# Patient Record
Sex: FEMALE | Race: WHITE | NOT HISPANIC OR LATINO | URBAN - METROPOLITAN AREA
[De-identification: names, ages, dates, MRNs, and addresses within clinical notes are randomized per-mention and may not be internally consistent; named-entity substitution may affect disease eponyms.]

---

## 2023-08-03 ENCOUNTER — OUTPATIENT (OUTPATIENT)
Dept: OUTPATIENT SERVICES | Facility: HOSPITAL | Age: 53
LOS: 1 days | End: 2023-08-03
Payer: COMMERCIAL

## 2023-08-03 ENCOUNTER — TRANSCRIPTION ENCOUNTER (OUTPATIENT)
Age: 53
End: 2023-08-03

## 2023-08-03 VITALS
HEART RATE: 65 BPM | OXYGEN SATURATION: 98 % | DIASTOLIC BLOOD PRESSURE: 78 MMHG | RESPIRATION RATE: 14 BRPM | SYSTOLIC BLOOD PRESSURE: 138 MMHG

## 2023-08-03 VITALS
RESPIRATION RATE: 21 BRPM | TEMPERATURE: 96 F | DIASTOLIC BLOOD PRESSURE: 71 MMHG | WEIGHT: 293 LBS | OXYGEN SATURATION: 97 % | HEIGHT: 67 IN | HEART RATE: 75 BPM | SYSTOLIC BLOOD PRESSURE: 148 MMHG

## 2023-08-03 DIAGNOSIS — K59.04 CHRONIC IDIOPATHIC CONSTIPATION: ICD-10-CM

## 2023-08-03 DIAGNOSIS — R13.14 DYSPHAGIA, PHARYNGOESOPHAGEAL PHASE: ICD-10-CM

## 2023-08-03 PROCEDURE — 88305 TISSUE EXAM BY PATHOLOGIST: CPT | Mod: 26

## 2023-08-03 PROCEDURE — 88305 TISSUE EXAM BY PATHOLOGIST: CPT

## 2023-08-03 PROCEDURE — 43239 EGD BIOPSY SINGLE/MULTIPLE: CPT

## 2023-08-03 PROCEDURE — 45380 COLONOSCOPY AND BIOPSY: CPT | Mod: PT

## 2023-08-03 RX ORDER — SODIUM CHLORIDE 9 MG/ML
500 INJECTION INTRAMUSCULAR; INTRAVENOUS; SUBCUTANEOUS
Refills: 0 | Status: DISCONTINUED | OUTPATIENT
Start: 2023-08-03 | End: 2023-08-17

## 2023-08-03 RX ORDER — LIDOCAINE HCL 20 MG/ML
4 VIAL (ML) INJECTION ONCE
Refills: 0 | Status: DISCONTINUED | OUTPATIENT
Start: 2023-08-03 | End: 2023-08-17

## 2023-08-03 NOTE — ASU PATIENT PROFILE, ADULT - FALL HARM RISK - UNIVERSAL INTERVENTIONS
Bed in lowest position, wheels locked, appropriate side rails in place/Call bell, personal items and telephone in reach/Instruct patient to call for assistance before getting out of bed or chair/Non-slip footwear when patient is out of bed/[place] to call system/Physically safe environment - no spills, clutter or unnecessary equipment/Purposeful Proactive Rounding/Room/bathroom lighting operational, light cord in reach

## 2023-08-03 NOTE — PRE PROCEDURE NOTE - PRE PROCEDURE EVALUATION
Attending Physician: Cindy Penaloza MD                            Procedure: EGD and colonoscopy    Indication for Procedure: dysphagia, screening  ________________________________________________________  PAST MEDICAL & SURGICAL HISTORY:  H/O esophageal reflux    No significant past surgical history    ALLERGIES:  No Known Allergies    HOME MEDICATIONS:    AICD/PPM: [ ] yes   [X] no    PERTINENT LAB DATA:        PHYSICAL EXAMINATION:    Height (cm): 170.2  Weight (kg): 152  BMI (kg/m2): 52.5  BSA (m2): 2.52T(C): --  HR: 75  BP: 148/71  RR: 21  SpO2: 97%    Constitutional: NAD    Neck:  No JVD  Respiratory: CTAB/L  Cardiovascular: S1 and S2  Gastrointestinal: BS+, soft, NT/ND  Extremities: No peripheral edema  Neurological: A/O x 3, no focal deficits        COMMENTS:    The patient is a suitable candidate for the planned procedure unless box checked [ ]  No, explain:

## 2023-08-03 NOTE — ASU DISCHARGE PLAN (ADULT/PEDIATRIC) - NS MD DC FALL RISK RISK
For information on Fall & Injury Prevention, visit: [email or web address] OR  [email or web address] OR  https://www.cdc.gov/steadi/patient.html

## 2023-08-07 LAB — SURGICAL PATHOLOGY STUDY: SIGNIFICANT CHANGE UP

## (undated) DEVICE — SYR LUER LOK 50CC

## (undated) DEVICE — PACK IV START WITH CHG

## (undated) DEVICE — CLAMP BX HOT RAD JAW 3

## (undated) DEVICE — SENSOR O2 FINGER ADULT

## (undated) DEVICE — SOL INJ NS 0.9% 500ML 2 PORT

## (undated) DEVICE — ELCTR GROUNDING PAD ADULT COVIDIEN

## (undated) DEVICE — BITE BLOCK ADULT 20 X 27MM (GREEN)

## (undated) DEVICE — BRUSH COLONOSCOPY CYTOLOGY

## (undated) DEVICE — TUBING SUCTION 20FT

## (undated) DEVICE — TUBING IV SET GRAVITY 3Y 100" MACRO

## (undated) DEVICE — SYR ALLIANCE II INFLATION 60ML

## (undated) DEVICE — BIOPSY FORCEP RADIAL JAW 4 STANDARD WITH NEEDLE

## (undated) DEVICE — POLY TRAP ETRAP

## (undated) DEVICE — CATH IV SAFE BC 22G X 1" (BLUE)

## (undated) DEVICE — FORCEP RADIAL JAW 4 JUMBO 2.8MM 3.2MM 240CM ORANGE DISP

## (undated) DEVICE — FOLEY HOLDER STATLOCK 2 WAY ADULT

## (undated) DEVICE — BALLOON US ENDO

## (undated) DEVICE — CATH IV SAFE BC 20G X 1.16" (PINK)

## (undated) DEVICE — TUBING SUCTION CONN 6FT STERILE

## (undated) DEVICE — IRRIGATOR BIO SHIELD

## (undated) DEVICE — SUCTION YANKAUER NO CONTROL VENT